# Patient Record
Sex: MALE | Race: WHITE | NOT HISPANIC OR LATINO | Employment: OTHER | URBAN - METROPOLITAN AREA
[De-identification: names, ages, dates, MRNs, and addresses within clinical notes are randomized per-mention and may not be internally consistent; named-entity substitution may affect disease eponyms.]

---

## 2022-09-08 ENCOUNTER — OFFICE VISIT (OUTPATIENT)
Dept: OPHTHALMOLOGY | Facility: MEDICAL CENTER | Age: 72
End: 2022-09-08
Payer: MEDICARE

## 2022-09-08 DIAGNOSIS — H49.9 OPHTHALMOPLEGIA: ICD-10-CM

## 2022-09-08 DIAGNOSIS — H52.13 MYOPIA OF BOTH EYES: ICD-10-CM

## 2022-09-08 DIAGNOSIS — H46.9 OPTIC NEUROPATHY: ICD-10-CM

## 2022-09-08 DIAGNOSIS — H25.13 AGE-RELATED NUCLEAR CATARACT OF BOTH EYES: ICD-10-CM

## 2022-09-08 DIAGNOSIS — R68.89 SUSPECTED GLAUCOMA OF BOTH EYES: ICD-10-CM

## 2022-09-08 DIAGNOSIS — G20.A1 PARKINSON DISEASE (HCC): ICD-10-CM

## 2022-09-08 PROCEDURE — 92060 SENSORIMOTOR EXAMINATION: CPT | Performed by: OPHTHALMOLOGY

## 2022-09-08 PROCEDURE — V2718 FRESNELL PRISM PRESS-ON LENS: HCPCS | Performed by: OPHTHALMOLOGY

## 2022-09-08 PROCEDURE — 92250 FUNDUS PHOTOGRAPHY W/I&R: CPT | Performed by: OPHTHALMOLOGY

## 2022-09-08 PROCEDURE — 99205 OFFICE O/P NEW HI 60 MIN: CPT | Mod: 25 | Performed by: OPHTHALMOLOGY

## 2022-09-08 RX ORDER — MIRABEGRON 50 MG/1
TABLET, FILM COATED, EXTENDED RELEASE ORAL
COMMUNITY
End: 2022-11-10

## 2022-09-08 RX ORDER — TESTOSTERONE GEL, 1% 10 MG/G
GEL TRANSDERMAL
COMMUNITY

## 2022-09-08 RX ORDER — TRAZODONE HYDROCHLORIDE 50 MG/1
TABLET ORAL
COMMUNITY
Start: 2022-09-02

## 2022-09-08 ASSESSMENT — ENCOUNTER SYMPTOMS
BLURRED VISION: 1
DOUBLE VISION: 1

## 2022-09-08 ASSESSMENT — REFRACTION_WEARINGRX
OS_ADD: +2.75
SPECS_TYPE: TRIFOCAL
OD_CYLINDER: +2.25
OS_AXIS: 085
OD_ADD: +2.75
OD_AXIS: 094
OS_SPHERE: -6.75
OS_CYLINDER: +1.75
OD_SPHERE: -5.00

## 2022-09-08 ASSESSMENT — CUP TO DISC RATIO
OS_RATIO: 0.2
OD_RATIO: 0.2

## 2022-09-08 ASSESSMENT — SLIT LAMP EXAM - LIDS
COMMENTS: NORMAL
COMMENTS: NORMAL

## 2022-09-08 ASSESSMENT — CONF VISUAL FIELD
OD_SUPERIOR_NASAL_RESTRICTION: 0
OS_SUPERIOR_NASAL_RESTRICTION: 0
OD_SUPERIOR_TEMPORAL_RESTRICTION: 0
OD_INFERIOR_NASAL_RESTRICTION: 0
OS_SUPERIOR_TEMPORAL_RESTRICTION: 0
OS_INFERIOR_NASAL_RESTRICTION: 0
OS_INFERIOR_TEMPORAL_RESTRICTION: 0
OS_NORMAL: 1
OD_INFERIOR_TEMPORAL_RESTRICTION: 0
OD_NORMAL: 1

## 2022-09-08 ASSESSMENT — VISUAL ACUITY
OS_CC: J2
METHOD: SNELLEN - LINEAR
OD_CC: J2
CORRECTION_TYPE: GLASSES
OS_CC: 20/30
OS_CC+: -1
OD_CC: 20/30
OD_CC+: -2

## 2022-09-08 ASSESSMENT — REFRACTION_MANIFEST
OS_AXIS: 064
OS_SPHERE: -4.50
OS_CYLINDER: +1.50
OD_CYLINDER: +3.00
OD_AXIS: 098
OD_SPHERE: -4.25

## 2022-09-08 ASSESSMENT — TONOMETRY
OS_IOP_MMHG: 12
OD_IOP_MMHG: 10
IOP_METHOD: I-CARE

## 2022-09-08 NOTE — ASSESSMENT & PLAN NOTE
9/8/2022-worsening control extraocular movements leading to some very mild convergence insufficiency at near.  Bilateral stare.  Slightly slow EOMs

## 2022-09-08 NOTE — ASSESSMENT & PLAN NOTE
9/8/2022-double vision slightly worse on right gaze secondary to a combination of early myopic strabismus fixes, adult onset divergence insufficiency, and parkinsonism.  Placed for diopter base out press on prism over the right lens to help increase binocular field.  Discussed that most likely will need prisms and glasses possibly 2 separate pair 1 for distance with basalis and 1 for near with base in if inability to control convergence insufficiency secondary to parkinsonism.  Contemplating cataract extraction in the near future so discussed giving new glasses following.  I also reviewed the MRI scan images which were only axial 9 contrast-enhanced but did not demonstrate any brainstem lesion

## 2022-09-08 NOTE — ASSESSMENT & PLAN NOTE
9/8/2022.-Tilted optic nerves with peripapillary atrophy IOP normal OCT nerve fiber layer thickness 83 OD 92 OS

## 2022-09-08 NOTE — PROGRESS NOTES
MD never showed on tele consult. Tech will call again. Peds/Neuro Ophthalmology:   Williams Thacker M.D.    Date & Time note created:    9/8/2022   1:46 PM     Referring MD / APRN:  Anson Hathaway P.A.-C., No att. providers found    Patient ID:  Name:             Collin Swift   YOB: 1950  Age:                 72 y.o.  male   MRN:               [id number]    Chief Complaint/Reason for Visit:     Other (New patient for double vision in both eyes. )      History of Present Illness:    Collin Swift is a 72 y.o. male   New patient for double vision in both eyes. Pt states vision is blurry for near with glasses. He sees double vision when he has to see far away or when he moves his head off to the side. Pt feels both eyes get very dry at times. Does not uses any eye drops. Pt has been told he need to have cataract surgery but at this time does not know if it would help double vision. 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       Review of Systems:  Review of Systems   Eyes:  Positive for blurred vision and double vision.        Dry eyes OU   All other systems reviewed and are negative.    Past Medical History:   Past Medical History:   Diagnosis Date    Incontinence of urine     Parkinson disease (HCC)        Past Surgical History:  History reviewed. No pertinent surgical history.    Current Outpatient Medications:  Current Outpatient Medications   Medication Sig Dispense Refill    Mirabegron ER (MYRBETRIQ) 50 MG TABLET SR 24 HR 1 tablet      carbidopa-levodopa (SINEMET)  MG Tab Take 1 Tablet by mouth 2 times a day.      traZODone (DESYREL) 50 MG Tab TAKE 1/2 (ONE-HALF) TABLET BY MOUTH EVERY DAY AT BEDTIME      testosterone (TESTIM/ANDROGEL) 50 MG/5GM (1%) Gel gel testosterone 1 % (50 mg/5 gram) transdermal gel packet   Apply 1 packet every day by transdermal route for 90 days.       No current facility-administered medications for this visit.       Allergies:  No Known Allergies    Family  History:  Family History   Problem Relation Age of Onset    Cancer Mother     Alcohol abuse Father     Drug abuse Sister     Colorectal Cancer Brother        Social History:  Social History     Socioeconomic History    Marital status:      Spouse name: Not on file    Number of children: Not on file    Years of education: Not on file    Highest education level: Not on file   Occupational History    Not on file   Tobacco Use    Smoking status: Never    Smokeless tobacco: Never   Vaping Use    Vaping Use: Never used   Substance and Sexual Activity    Alcohol use: Not Currently    Drug use: Never    Sexual activity: Not on file   Other Topics Concern    Not on file   Social History Narrative    Retired     Social Determinants of Health     Financial Resource Strain: Not on file   Food Insecurity: Not on file   Transportation Needs: Not on file   Physical Activity: Not on file   Stress: Not on file   Social Connections: Not on file   Intimate Partner Violence: Not on file   Housing Stability: Not on file          Physical Exam:  Physical Exam    Oriented x 3  Weight/BMI: There is no height or weight on file to calculate BMI.  There were no vitals taken for this visit.    Base Eye Exam       Visual Acuity (Snellen - Linear)         Right Left    Dist cc 20/30 -2 20/30 -1    Dist ph cc NI NI    Near cc J2 J2      Correction: Glasses              Tonometry (i[facility], 11:22 AM)         Right Left    Pressure 10 12              Pupils         Pupils    Right PERRL    Left PERRL              Visual Fields         Right Left     Full Full              Neuro/Psych       Mood/Affect: parkinsonism              Dilation       Both eyes: able to view without dilation                   Additional Tests       Color         Right Left    [place]sh[place] 12/12 12/12              Stereo       Fly: +    Animals: 3/3    Circles: 9/9                  Strabismus Exam         0 0 0   0 0 0                      E(T) 6 0  0  E(T) 4 0  0  E(T) 2                      0 0 0   0 0 0                       Slit Lamp and Fundus Exam       External Exam         Right Left    External stare stare              Slit Lamp Exam         Right Left    Lids/Lashes Normal Normal    Conjunctiva/Sclera White and quiet White and quiet    Cornea Clear Clear    Anterior Chamber Deep and quiet Deep and quiet    Iris Round and reactive Round and reactive    Lens Nuclear sclerosis Nuclear sclerosis    Vitreous Normal Normal              Fundus Exam         Right Left    Disc Peripapillary atrophy Peripapillary atrophy    C/D Ratio 0.2 0.2    Macula Normal Normal    Vessels Normal Normal    Periphery Normal Normal                  Refraction       Wearing Rx         Sphere Cylinder Axis Add    Right -5.00 +2.25 094 +2.75    Left -6.75 +1.75 085 +2.75      Age: 1yr    Type: Trifocal              Manifest Refraction         Sphere Cylinder Axis    Right -4.25 +3.00 098    Left -4.50 +1.50 064                    Pertinent Lab/Test/Imaging Review:  Notes from [facility], MRI images on CD    Assessment and Plan:     Parkinson disease (HCC)  9/8/2022-worsening control extraocular movements leading to some very mild convergence insufficiency at near.  Bilateral stare.  Slightly slow EOMs    Suspected glaucoma of both eyes  9/8/2022.-Tilted optic nerves with peripapillary atrophy IOP normal OCT nerve fiber layer thickness 83 OD 92 OS    Ophthalmoplegia  9/8/2022-double vision slightly worse on right gaze secondary to a combination of early myopic strabismus fixes, adult onset divergence insufficiency, and parkinsonism.  Placed for diopter base out press on prism over the right lens to help increase binocular field.  Discussed that most likely will need prisms and glasses possibly 2 separate pair 1 for distance with basalis and 1 for near with base in if inability to control convergence insufficiency secondary to parkinsonism.  Contemplating cataract extraction in the near  future so discussed giving new glasses following.  I also reviewed the MRI scan images which were only axial 9 contrast-enhanced but did not demonstrate any brainstem lesion    Myopia of both eyes  9/8/2022-bilateral moderate myopia with tilted nerves and peripapillary atrophy.  Probably exacerbating inability to control esotropia      Age-related nuclear cataract of both eyes  9/8/2022-bilateral nuclear sclerotic cataracts approaching visually significant.  Has consultation with [place] [facility]    Greater than 60 minute were spent examining the patient, reviewing records from referring providers including MRI images if available and records within the [facility] system, counselling the patient and family regarding the examination findings, diagnostic testing preformed, recommendations for management, prognosis, further testing and referrals as appropriate and follow up. This in addition to time spent interpreting separate billable tests done during the visit.      Williams Thacker M.D.

## 2022-09-08 NOTE — ASSESSMENT & PLAN NOTE
9/8/2022-bilateral moderate myopia with tilted nerves and peripapillary atrophy.  Probably exacerbating inability to control esotropia

## 2022-09-08 NOTE — ASSESSMENT & PLAN NOTE
9/8/2022-bilateral nuclear sclerotic cataracts approaching visually significant.  Has consultation with [facility]

## 2022-11-07 ASSESSMENT — ENCOUNTER SYMPTOMS: SLEEP DISTURBANCE: 1

## 2022-11-10 ENCOUNTER — OFFICE VISIT (OUTPATIENT)
Dept: SLEEP MEDICINE | Facility: MEDICAL CENTER | Age: 72
End: 2022-11-10
Payer: MEDICARE

## 2022-11-10 VITALS
OXYGEN SATURATION: 94 % | WEIGHT: 128 LBS | HEIGHT: 69 IN | DIASTOLIC BLOOD PRESSURE: 70 MMHG | RESPIRATION RATE: 14 BRPM | BODY MASS INDEX: 18.96 KG/M2 | HEART RATE: 60 BPM | SYSTOLIC BLOOD PRESSURE: 112 MMHG

## 2022-11-10 DIAGNOSIS — G47.00 FREQUENT NOCTURNAL AWAKENING: ICD-10-CM

## 2022-11-10 DIAGNOSIS — F51.04 CHRONIC INSOMNIA: ICD-10-CM

## 2022-11-10 DIAGNOSIS — G47.30 SLEEP DISORDER BREATHING: Primary | ICD-10-CM

## 2022-11-10 DIAGNOSIS — R53.83 FATIGUE, UNSPECIFIED TYPE: ICD-10-CM

## 2022-11-10 PROBLEM — N13.8 BENIGN PROSTATIC HYPERPLASIA WITH URINARY OBSTRUCTION: Status: ACTIVE | Noted: 2022-09-27

## 2022-11-10 PROBLEM — N40.1 BENIGN PROSTATIC HYPERPLASIA WITH URINARY OBSTRUCTION: Status: ACTIVE | Noted: 2022-09-27

## 2022-11-10 PROBLEM — R35.1 NOCTURIA: Status: ACTIVE | Noted: 2022-02-16

## 2022-11-10 PROCEDURE — 99204 OFFICE O/P NEW MOD 45 MIN: CPT | Performed by: STUDENT IN AN ORGANIZED HEALTH CARE EDUCATION/TRAINING PROGRAM

## 2022-11-10 ASSESSMENT — PATIENT HEALTH QUESTIONNAIRE - PHQ9
5. POOR APPETITE OR OVEREATING: 0 - NOT AT ALL
CLINICAL INTERPRETATION OF PHQ2 SCORE: 2
SUM OF ALL RESPONSES TO PHQ QUESTIONS 1-9: 17

## 2022-11-10 NOTE — PROGRESS NOTES
[facility] Consult Note     Date: 11/10/2022 / Time: 12:38 PM      Thank you for requesting a sleep medicine consultation on Collin Swift at the sleep center. Presents today with the chief complaints of snoring, occasional excessive daytime sleepiness, trouble staying asleep. He is referred by Porfirio Scanlon M.D.  [address] [facility] [address]  [place] [address] for evaluation and treatment of insomnia.    HISTORY OF PRESENT ILLNESS:     Collin Swift is a 72 y.o. male with Parkinson's disease, frequent urination, chronic insomnia.  Presents to Sleep Clinic for evaluation of insomnia and sleep.    He states for the last year he has been having trouble with sleep.  He does not have trouble falling asleep initially but does have trouble staying asleep.  He wakes up anywhere between 5 and 6 times a night to use the restroom.  With his awakenings he will sometimes have trouble getting back to sleep particularly around 3 AM to 4 AM.  Starting approximately 5 to 6 months ago he was placed on trazodone.  Starting with 25 mg and since increased to 100 mg.  He does find the trazodone helps him be able to sleep 4 hours a night.  States without trazodone he would sleep maybe an hour or so.    His wife shares the bedroom with him but not the same bed.  She does report that he snores loudly at the beginning of the night and as blankets on and this seems to decrease.  Denies any choking or gasping or pauses in breathing.  He does wake up feeling unrested.  He is tired at times during the day and does have fatigue.  He does have brain fog during the day.  He attributes some of this may be to his Parkinson's but he feels that his sleep is greatly impacting his quality of life.    He does move his legs at night.  Denies any purposeful movements such as punching kicking hitting.  He will occasionally yell in his sleep.    As per supplemental questionnaire to be scanned or imported into chart:    [place]  "Sleepiness Score: 11    Sleep Schedule  Bedtime: Weekday & Weekend 10pm  Wake time: Weekday & Weekend 3-4am  Sleep-onset latency: 15-20min   Awakenings from sleep: 5-6 times to restroom   Difficulty falling back asleep: yes   Bedroom partner: No separate beds  Naps: No     DAYTIME SYMPTOMS:   Excessive daytime sleepiness: Yes  Daytime fatigue: Yes  Difficulty concentrating: Yes  Memory problems: Yes  Irritability:Yes  Work/school performance issues: No   Sleepiness with driving: No  only short drives   Caffeine/stimulant use: No   Alcohol use:Yes, How Many? 3 oz of wine with dinner      SLEEP RELATED SYMPTOMS  Snoring: Yes loud at times at beginning of night   Witnessed apnea or gasping/choking: No   Dry mouth or mouth breathing: Yes  Sweating: No   Teeth grinding/biting: Yes per dentist  Morning headaches: No   Refreshed Upon Awakening: No      SLEEP RELATED BEHAVIORS:  Parasomnias (walking, talking, eating, violence): Yes, sometimes yelling   Leg kicking: Yes  Restless legs - \"urge to move\": No   Nightmares: No  Recurrent: No   Dream enactment: No      NARCOLEPSY:  Cataplexy: No   Sleep paralysis: No   Sleep attacks: No   Hypnagogic/hypnopompic hallucinations: No     MEDICAL HISTORY  Past Medical History:   Diagnosis Date    Chills     Constipation     Daytime sleepiness     Depression     Double vision     Fatigue     Fever     Frequent urination     [place] measles     Hoarseness, persistent     Hypertension     Incontinence of urine     Influenza     Insomnia     Parkinson disease (HCC)     Snoring     Tonsillitis     Toothache     Weakness     Wears glasses     Weight loss         SURGICAL HISTORY  Past Surgical History:   Procedure Laterality Date    TONSILLECTOMY          FAMILY HISTORY  Family History   Problem Relation Age of Onset    Cancer Mother     Alcohol abuse Father     Drug abuse Sister     Colorectal Cancer Brother     Cancer Brother         colon cancer    Colon Cancer Brother        SOCIAL " "HISTORY  Social History     Socioeconomic History    Marital status:    Tobacco Use    Smoking status: Former     Packs/day: 1.00     Years: 3.00     Pack years: 3.00     Types: Cigarettes, Pipe, Cigars     Start date: 1964     Quit date: 1967     Years since quittin.8    Smokeless tobacco: Never    Tobacco comments:     thank God I quit in my youth!   Vaping Use    Vaping Use: Never used   Substance and Sexual Activity    Alcohol use: Yes     Alcohol/week: 42.0 oz     Types: 70 Glasses of wine per week     Comment: just small glass of wine with dinner    Drug use: Not Currently     Types: Marijuana, Oral     Comment: LSD, hashish, as teen in 1960s   Social History Narrative    Retired        Occupation: retired     CURRENT MEDICATIONS  Current Outpatient Medications   Medication Sig Dispense Refill    carbidopa-levodopa (SINEMET)  MG Tab Take 1 Tablet by mouth 2 times a day.      traZODone (DESYREL) 50 MG Tab TAKE 1/2 (ONE-HALF) TABLET BY MOUTH EVERY DAY AT BEDTIME      testosterone (TESTIM/ANDROGEL) 50 MG/5GM (1%) Gel gel testosterone 1 % (50 mg/5 gram) transdermal gel packet   Apply 1 packet every day by transdermal route for 90 days.      Melatonin-Pyridoxine 5-1 MG Tab Melatonin (with B6) 5 mg-1 mg tablet   5mg 1/day       No current facility-administered medications for this visit.       REVIEW OF SYSTEMS  Constitutional: Denies fevers, Denies weight changes  Ears/Nose/Throat/Mouth: Denies nasal congestion or sore throat   Cardiovascular: Denies chest pain  Respiratory: Denies shortness of breath, Denies cough  Gastrointestinal/Hepatic: Denies nausea, vomiting  Sleep: see HPI    Physical Examination:  Vitals/ General Appearance:   Weight/BMI: Body mass index is 18.9 kg/m².  /70 (BP Location: Left arm, Patient Position: Sitting, BP Cuff Size: Large adult)   Pulse 60   Resp 14   Ht 1.753 m (5' 9\")   Wt 58.1 kg (128 lb)   SpO2 94%   Vitals:    11/10/22 1238   BP: 112/70   BP " "Location: Left arm   Patient Position: Sitting   BP Cuff Size: Large adult   Pulse: 60   Resp: 14   SpO2: 94%   Weight: 58.1 kg (128 lb)   Height: 1.753 m (5' 9\")       Pt. is alert and oriented to time, place and person. Cooperative and in no apparent distress.     Constitutional: Alert, no distress, well-groomed.  Skin: No rashes in visible areas.  Eye: Round. Conjunctiva clear, lids normal. No icterus.   ENT EXAM  Nasal alae/valves collapsible: No   Nasal septum deviation: Yes  Nasal turbinate hypertrophy: Left: Grade 2   Right: Grade 1  Hard palate narrow: Yes  Hard palate high: Yes  Soft palate/uvula (Mallampati score): 3  Tongue Scalloping: No   Retrognathia: No   Micrognathia: No   Cardiovascular:no murmus/gallops/rubs, normal S1 and S2 heart sounds, regular rate and rhythm  Pulmonary:Clear to auscultation, No wheezes, No crackles.  Neurologic:Awake, alert and oriented x 3, Normal age appropriate gait, No involuntary motions.  Extremities: No clubbing, cyanosis, or edema       ASSESSMENT AND PLAN   Collin Swift is a 72 y.o. male with Parkinson's disease, frequent urination, chronic insomnia.  Presents to Sleep Clinic for evaluation of insomnia and sleep.    1. Collin Swift  has symptoms of Obstructive Sleep Apnea (PREET). Collin Swift has symptoms of snoring, frequent nocturnal awakenings, nocturia, unrefreshed upon awakening, fatigue, brain fog. These  interfere with activities of daily living.   ESS 11  Pt has risk factors for PREET include age and crowded oropharynx.     The pathophysiology of PREET and the increased risk of cardiovascular morbidity from untreated PREET is discussed in detail with the patient. He  also has nsomnia, which can be worsened by PREET.      We have discussed diagnostic options including in-laboratory, attended polysomnography and home sleep testing. We have also discussed treatment options including airway pressurization, reconstructive otolaryngologic surgery, dental appliances and " weight management.     Subsequently,treatment options will be discussed based on the diagnostic study. Meanwhile, He is urged to avoid supine sleep, weight gain and alcoholic beverages since all of these can worsen PREET. He is cautioned against drowsy driving. If He feels sleepy while driving, advised must pull over for a break/nap, rather than persist on the road, in the interest of Pt's own safety and that of others on the road.    Plan  -  He  will be scheduled for an overnight PSG to assess sleep related breathing disorder.  - Follow up 1-2 weeks after sleep study to discuss results and treatment options moving forward   -Advised to reach out via [employer] or by phone with any questions or concerns.     2. Chronic Insomnia   With  frequent awakenings with difficulty falling back asleep and feeling this is impacting daily functioning for past year meets criteria for chronic insomnia. Discussed potential causes of insomnia and importance of having a routine around bedtime. Dicussed cognitive behavioral therapy for insomnia (CBTi) which is first line treatment for insomnia.     Advised that since there is some suspicion of obstructive sleep apnea would recommend doing for sleep apnea.  If test is positive treating for sleep apnea may improve his chronic insomnia.  Test is negative patient would benefit from undergoing cognitive behavioral therapy for insomnia.    RECOMMENDATIONS  -Maintain a regular sleep schedule  -Avoid caffeinated beverages after lunch, and alcohol in late afternoon and evening  -Avoid prolonged use of light-emitting screens before bedtime  -Exercise regularly for at least 20 minutes, preferably more than four to five hours prior to bedtime  -Avoid daytime naps, especially if they are longer than 20 to 30 minutes or occur late in the day  -Advised to contact our office or myself with any questions via "[employer]hart     Regarding treatment of other past medical problems and general health maintenance,  Pt  is urged to follow up with PCP.      Please note portions of this record was created using voice recognition software. I have made every reasonable attempt to correct obvious errors, but I expect that there are errors of grammar and possibly content I did not discover before finalizing the note.

## 2023-02-13 ENCOUNTER — APPOINTMENT (OUTPATIENT)
Dept: SLEEP MEDICINE | Facility: MEDICAL CENTER | Age: 73
End: 2023-02-13
Attending: STUDENT IN AN ORGANIZED HEALTH CARE EDUCATION/TRAINING PROGRAM
Payer: MEDICARE

## 2023-02-27 ENCOUNTER — APPOINTMENT (OUTPATIENT)
Dept: SLEEP MEDICINE | Facility: MEDICAL CENTER | Age: 73
End: 2023-02-27
Payer: MEDICARE

## 2023-03-03 ENCOUNTER — SLEEP STUDY (OUTPATIENT)
Dept: SLEEP MEDICINE | Facility: MEDICAL CENTER | Age: 73
End: 2023-03-03
Attending: STUDENT IN AN ORGANIZED HEALTH CARE EDUCATION/TRAINING PROGRAM
Payer: MEDICARE

## 2023-03-03 DIAGNOSIS — G47.00 FREQUENT NOCTURNAL AWAKENING: ICD-10-CM

## 2023-03-03 DIAGNOSIS — R53.83 FATIGUE, UNSPECIFIED TYPE: ICD-10-CM

## 2023-03-03 DIAGNOSIS — G47.30 SLEEP DISORDER BREATHING: ICD-10-CM

## 2023-03-03 DIAGNOSIS — F51.04 CHRONIC INSOMNIA: ICD-10-CM

## 2023-03-03 PROCEDURE — 95811 POLYSOM 6/>YRS CPAP 4/> PARM: CPT | Performed by: STUDENT IN AN ORGANIZED HEALTH CARE EDUCATION/TRAINING PROGRAM

## 2023-03-06 NOTE — PROCEDURES
MONTAGE: Standard  STUDY TYPE: Split Night    RECORDING TECHNIQUE:   After the scalp was prepared, gold plated electrodes were applied to the scalp according to the International 10-20 System. EEG (electroencephalogram) was continuously monitored from the O1-M2, O2-M1, C3-M2, C4-M1, F3-M2, and F4-M1. EOGs (electrooculograms) were monitored by electrodes placed at the left and right outer canthi. Chin EMG (electromyogram) was monitored by electrodes placed on the mentalis and sub-mentalis muscles. Nasal and oral airflow were monitored using a triple port thermocouple as well as oronasal pressure transducer. Respiratory effort was measured by inductive plethysmography technology employing abdominal and thoracic belts. Blood oxygen saturation and pulse were monitored by pulse oximetry. Heart rhythm was monitored by surface electrocardiogram. Leg EMG was studied using surface electrodes placed on left and right anterior tibialis. A microphone was used to monitor tracheal sounds and snoring. Body position was monitored and documented by technician observation.   SCORING CRITERIA:   A modification of the AASM manual for scoring of sleep and associated events was used. Obstructive apneas were scored by cessation of airflow for at least 10 seconds with continuing respiratory effort. Central apneas were scored by cessation of airflow for at least 10 seconds with no respiratory effort. Hypopneas were scored by a 30% or more reduction in airflow for at least 10 seconds accompanied by arterial oxygen desaturation of 3% or an arousal. For CMS (Medicare) patients, per AASM rule 1B, hypopneas are scored by 30% with mild reduction in airflow for at least 10 seconds accompanied by arterial saturation decreased at 4%.    DIAGNOSTIC  Study start time was 09:03:44 PM. Diagnostic recording time was 196 minutes with a total sleep time of 151 minutes resulting in a sleep efficiency of 76.84%%. Sleep latency from the start of the study was  10 minutes and the latency from sleep to REM was 78 minutes. In total,22 arousals were scored for an arousal index of 8.7.  Respiratory:  There were a total of 15 apneas consisting of 3 obstructive apneas, 0 mixed apneas, and 12 central apneas. A total of 74 hypopneas were scored. The apnea index was 5.96 per hour and the hypopnea index was 29.40 per hour resulting in an overall AHI of 35.36. AHI during rem was 21.4 and AHI while supine was 35.36.  Oximetry:  There was a mean oxygen saturation of 93.0%. The minimum oxygen saturation during NREM sleep was83.0% and in REM was 89.0%. Time spent during sleep with oxygen saturations <88% was 11.2 minutes.   Cardiac:  The highest heart rate seen while awake was 118 BPM while the highest heart rate during sleep was 79 BPM with an average sleeping heart rate of 54 BPM.  Limb Movements:  There were a total of 15 PLMs during sleep, which resulted in a PLM index of 6.0. There were 0 PLMs associated with arousals which resulted in a PLMS arousal index of 0.0.    TREATMENT:  Treatment recording time was 4h 37.0m (277 minutes) with a total sleep time of 3h 50.5m (230 minutes) resulting in a sleep efficiency of 83.2%. Sleep latency from the start of treatment was 02 minutes and REM latency from sleep onset was 1h 23.0m. The patient had 38 arousals in total for an arousal index of 9.9.  Respiratory:   There were 5 apneas in total consisting of 1 obstructive apneas, 4 central apneas, and 0 mixed apneas for an apnea index of 1.30. The patient had 2 hypopneas in total, which resulted in a hypopnea index of 0.52. The overall AHI was 1.82, with a REM AHI of 2.22, and a supine AHI of 1.82.   Oximetry:  The mean SaO2 during treatment was 95.0%. The minimum oxygen saturation in NREM was92.0 % and in REM was 93.0%. Patient spent 0.0 minutes of TST with SaO2 <88%.  Cardiac:  The highest heart rate during sleep was 79 BPM with an average sleeping heart rate of 53BPM.  Limb Movements:  There  were a total of 0 PLMS during titration sleep time that resulted in an index of 0.0. There were 3 PLMS associated with arousals. This resulted in a PLM arousal index of 0.8.  Titration: CPAP was tried from 5-9cm H2O.  This was a fully attended sleep study. This test was technically adequate. This patient was titrated on CPAP starting at 5 cm of water pressure. Patient was titrated up to 9 cm of water pressure. Patient did best at 8 cm of water pressure. Patient spent 29 minutes at that pressure and their AHI was 0 which is considered treated obstructive sleep apnea.     Impression:  1.  Severe obstructive sleep apnea with an overall AHI during diagnostic portion of 35.4  2.  Mild nocturnal hypoxia secondary to untreated sleep apnea minimum oxygen saturation 83%, time at or below 88% saturation of 11.2 minutes during diagnostic portion  3.  Met criteria for split-night protocol  4.  Patient responded well to CPAP therapy  5.  Supine REM sleep was seen while on CPAP  6.  Oxygen saturations improved while on CPAP therapy    Recommendations:  I recommend auto CPAP of 5-9 cm.  Patient used a medium DreamWear mask during night of study.     I also recommend 30 day compliance download to assess the efficacy to the recommended pressure, measure leak, apnea hypopnea index and compliance for further outpatient monitoring and management of PAP therapy. In some cases alternative treatment options may be proven effective in resolving sleep apnea. These options include upper airway surgery, the use of a dental orthotic, weight loss, or positional therapy. Clinical correlation is required. In general patients with sleep apnea are advised to avoid alcohol, sedatives and not to operate a motor vehicle while drowsy.  Untreated sleep apnea increases the risk for cardiovascular and neurovascular disease.

## 2023-03-13 ENCOUNTER — APPOINTMENT (OUTPATIENT)
Dept: SLEEP MEDICINE | Facility: MEDICAL CENTER | Age: 73
End: 2023-03-13
Payer: MEDICARE

## 2023-03-15 NOTE — PROGRESS NOTES
[facility] Follow-up Visit    Date of Visit: 3/21/2023     CC:  Follow-up for PREET management      HPI:  Collin Swift is a very pleasant 72 y.o. year old male former smoker (3 pack-years, quit in 1967), with a PMHx of Parkinson's, insomnia, who presented to the [facility] for a regular follow up. Last seen in the office on 11/10/2022 with Dr. Rivera.     Patient presents for sleep study results.  ***    Denies morning headaches.    Denies daytime drowsiness / driving drowsy.     Denies any issues falling asleep.      Snoring / Gasping / Apneas    Palpitations    Dry mouth    Denies any symptoms of RLS, narcolepsy or any nightmares, sleep walking or acting out of dreams.    Aerophagia    Mask leak / Skin irritation    Goes to bed:  Wakes up:  Naps (frequency and duration):  Awakenings:  Issues falling back to sleep?    Sleep History:  PSG 3/3/2023-        Patient Active Problem List    Diagnosis Date Noted    Fatigue 10/12/2022    Benign prostatic hyperplasia with urinary obstruction 09/27/2022    Parkinson disease (HCC)     Suspected glaucoma of both eyes     Ophthalmoplegia     Myopia of both eyes     Age-related nuclear cataract of both eyes     Nocturia 02/16/2022     Past Medical History:   Diagnosis Date    Chills     Constipation     Daytime sleepiness     Depression     Double vision     Fatigue     Fever     Frequent urination     [place] measles     Hoarseness, persistent     Hypertension     Incontinence of urine     Influenza     Insomnia     Parkinson disease (HCC)     Snoring     Tonsillitis     Toothache     Weakness     Wears glasses     Weight loss       Past Surgical History:   Procedure Laterality Date    TONSILLECTOMY       Family History   Problem Relation Age of Onset    Cancer Mother     Alcohol abuse Father     Drug abuse Sister     Colorectal Cancer Brother     Cancer Brother         colon cancer    Colon Cancer Brother      Social History     Socioeconomic History    Marital  status:      Spouse name: Not on file    Number of children: Not on file    Years of education: Not on file    Highest education level: Not on file   Occupational History    Not on file   Tobacco Use    Smoking status: Former     Packs/day: 1.00     Years: 3.00     Pack years: 3.00     Types: Cigarettes, Pipe, Cigars     Start date: 1964     Quit date: 1967     Years since quittin.2    Smokeless tobacco: Never    Tobacco comments:     thank God I quit in my youth!   Vaping Use    Vaping Use: Never used   Substance and Sexual Activity    Alcohol use: Yes     Alcohol/week: 42.0 oz     Types: 70 Glasses of wine per week     Comment: just small glass of wine with dinner    Drug use: Not Currently     Types: Marijuana, Oral     Comment: LSD, hashish, as teen in 1960s    Sexual activity: Not on file   Other Topics Concern    Not on file   Social History Narrative    Retired     Social Determinants of Health     Financial Resource Strain: Not on file   Food Insecurity: Not on file   Transportation Needs: Not on file   Physical Activity: Not on file   Stress: Not on file   Social Connections: Not on file   Intimate Partner Violence: Not on file   Housing Stability: Not on file     Current Outpatient Medications   Medication Sig Dispense Refill    Melatonin-Pyridoxine 5-1 MG Tab Melatonin (with B6) 5 mg-1 mg tablet   5mg 1/day      carbidopa-levodopa (SINEMET)  MG Tab Take 1 Tablet by mouth 2 times a day.      traZODone (DESYREL) 50 MG Tab TAKE 1/2 (ONE-HALF) TABLET BY MOUTH EVERY DAY AT BEDTIME      testosterone (TESTIM/ANDROGEL) 50 MG/5GM (1%) Gel gel testosterone 1 % (50 mg/5 gram) transdermal gel packet   Apply 1 packet every day by transdermal route for 90 days.       No current facility-administered medications for this visit.      ALLERGIES: Patient has no known allergies.    ROS:  Constitutional: Denies fever, chills, sweats,  weight loss, fatigue  Cardiovascular: Denies chest pain,  tightness, palpitations, swelling in legs/feet  Respiratory: Denies shortness of breath, cough, sputum, wheezing, painful breathing   Sleep: per HPI  Gastrointestinal: Denies  difficulty swallowing, nausea, abdominal pain, diarrhea, constipation, heartburn.  Musculoskeletal: Denies painful joints, sore muscles,       PHYSICAL EXAM:  There were no vitals taken for this visit.  Appearance: Well-nourished, well-developed, no acute distress  Eyes:  No scleral icterus , EOMI  ENMT: No redness of the oropharynx***  Lung auscultation:  No wheezes rhonchi rubs or rales***  Cardiac: No murmurs, rubs, or gallops; regular rhythm, normal rate; no edema***  Musculoskeletal:  Grossly normal; gait and station normal; digits and nails normal  Skin:  No rashes, petechiae, cyanosis  Neurologic: without focal signs; oriented to person, time, place, and purpose; judgement intact  Psychiatric:  No depression, anxiety, agitation  Mallampati score: Class ***    Assessment and Plan:    The medical record was reviewed.    Diagnostic and titration nocturnal polysomnogram's, home sleep apnea tests, continuous nocturnal oximetry results, multiple sleep latency tests, and compliance reports reviewed.    Problem List Items Addressed This Visit    None      PLAN: ****    1. Sleep Apnea:    The pathophysiology of sleep anea and the increased risk of cardiovascular morbidity from untreated sleep apnea is discussed in detail with the patient.  Urged to avoid supine sleep, weight gain and alcoholic beverages since all of these can worsen sleep apnea. Cautioned against drowsy driving. If feeling sleepy while driving, pull over for a break/nap, rather than persist on the road, in the interest of own safety and that of others on the road.    Compliance download was reviewed and discussed with the patient.     - Order placed for mask and supplies   - Compliance was reinforced  - Recommended the patient against the use of Ozone , such as  SoClean  - Clean supplies a couple times a week with dish soap and water and air dry  - Recommended the patient change out supplies as recommended for best mask fit and usage of the machine  - Advised patient to reach out via datangohart if any questions or concerns should arise.   - Equipment replacement schedule:  Mask cushion every month  Nasal pillows 2 times per month  Mask every 6 months  Head gear every 6 months  Tubing every 3 months  Ultra-fine filters 2 times per month  Foam filter every 6 months  Humidifier chamber every 6 months  Chin strap every 6 months    Has been advised to continue the current ***, clean equipment frequently, and get new mask and supplies as allowed by insurance and DME. Recommend an earlier appointment, if significant treatment barriers develop.    The risks of untreated sleep apnea were discussed with the patient at length. Patients with sleep apnea are at increased risk of cardiovascular disease including coronary artery disease, systemic arterial hypertension, pulmonary arterial hypertension, cardiac arrythmias, and stroke. The patient was advised to avoid driving a motor vehicle when drowsy.    Positive airway pressure will favorably impact many of the adverse conditions and effects provoked by sleep apnea.    Have advised the patient to follow up with the appropriate healthcare practitioners for all other medical problems and issues.    No follow-ups on file.      Please note portions of this record was created using voice recognition software. I have made every reasonable attempt to correct obvious errors, but I expect that there are errors of grammar and possibly content I did not discover before finalizing the note.    Time spent in record review prior to patient arrival, reviewing results, and in face-to-face encounter totaled *** min.  __________  MAKENNA Flower  Pulmonary & Sleep Medicine  [facility]

## 2023-03-21 ENCOUNTER — APPOINTMENT (OUTPATIENT)
Dept: SLEEP MEDICINE | Facility: MEDICAL CENTER | Age: 73
End: 2023-03-21
Payer: MEDICARE

## 2023-04-14 ENCOUNTER — APPOINTMENT (OUTPATIENT)
Dept: SLEEP MEDICINE | Facility: MEDICAL CENTER | Age: 73
End: 2023-04-14
Attending: NURSE PRACTITIONER
Payer: MEDICARE

## 2023-04-25 ENCOUNTER — OFFICE VISIT (OUTPATIENT)
Dept: SLEEP MEDICINE | Facility: MEDICAL CENTER | Age: 73
End: 2023-04-25
Attending: STUDENT IN AN ORGANIZED HEALTH CARE EDUCATION/TRAINING PROGRAM
Payer: MEDICARE

## 2023-04-25 VITALS
HEIGHT: 69 IN | SYSTOLIC BLOOD PRESSURE: 112 MMHG | WEIGHT: 151 LBS | RESPIRATION RATE: 14 BRPM | DIASTOLIC BLOOD PRESSURE: 66 MMHG | OXYGEN SATURATION: 96 % | BODY MASS INDEX: 22.36 KG/M2 | HEART RATE: 50 BPM

## 2023-04-25 DIAGNOSIS — G47.34 NOCTURNAL HYPOXIA: ICD-10-CM

## 2023-04-25 DIAGNOSIS — G47.33 OSA (OBSTRUCTIVE SLEEP APNEA): Primary | ICD-10-CM

## 2023-04-25 DIAGNOSIS — R53.83 FATIGUE, UNSPECIFIED TYPE: ICD-10-CM

## 2023-04-25 PROCEDURE — 99212 OFFICE O/P EST SF 10 MIN: CPT | Performed by: STUDENT IN AN ORGANIZED HEALTH CARE EDUCATION/TRAINING PROGRAM

## 2023-04-25 PROCEDURE — 99213 OFFICE O/P EST LOW 20 MIN: CPT | Performed by: STUDENT IN AN ORGANIZED HEALTH CARE EDUCATION/TRAINING PROGRAM

## 2023-04-25 NOTE — PROGRESS NOTES
[facility] Follow-up Visit    CC: Poor sleep and exhaustion during the day      HPI:  Collin Swift is a 72 y.o.male  with Parkinson's disease, daytime fatigue, and severe obstructive sleep apnea.  Presents today to discuss recent sleep study results.    He states he continues to feel tired during the day and feels exhausted.  He does not find his sleep restorative.  He is unsure as to why his sleep is so poor.  He has undergone a urologic procedure which has helped his urination during the day and at night.  He continues to wake up feeling unrested and have brain fog during the day.    He reports neither sleep study went well.  He did feel that CPAP helped his sleep.  He was surprised to learn that he slept as well as he did in the sleep lab.  His wife reported that following in lab study he seemed to have more energy during the day.    Sleep History  3/3/2023 PSG split-night study showed severe obstructive sleep apnea with an overall AHI of 35 events an hour.  Minimum oxygen saturation of 83%, time out of below 88% saturation during diagnostic portion of 11.2 minutes.  Patient responded very well to CPAP therapy.  AHI on CPAP was 1.82 and no time was spent at or below 88%.  Recommended pressures of 5-9 cmH2O    Patient Active Problem List    Diagnosis Date Noted    Fatigue 10/12/2022    Benign prostatic hyperplasia with urinary obstruction 09/27/2022    Parkinson disease (HCC)     Suspected glaucoma of both eyes     Ophthalmoplegia     Myopia of both eyes     Age-related nuclear cataract of both eyes     Nocturia 02/16/2022       Past Medical History:   Diagnosis Date    Chills     Constipation     Daytime sleepiness     Depression     Double vision     Fatigue     Fever     Frequent urination     Citizen of [place] measles     Hoarseness, persistent     Hypertension     Incontinence of urine     Influenza     Insomnia     Parkinson disease (HCC)     Snoring     Tonsillitis     Toothache     Weakness     Wears  glasses     Weight loss         Past Surgical History:   Procedure Laterality Date    TONSILLECTOMY         Family History   Problem Relation Age of Onset    Cancer Mother     Alcohol abuse Father     Drug abuse Sister     Colorectal Cancer Brother     Cancer Brother         colon cancer    Colon Cancer Brother        Social History     Socioeconomic History    Marital status:      Spouse name: Not on file    Number of children: Not on file    Years of education: Not on file    Highest education level: Not on file   Occupational History    Not on file   Tobacco Use    Smoking status: Former     Packs/day: 1.00     Years: 3.00     Pack years: 3.00     Types: Cigarettes, Pipe, Cigars     Start date: 1964     Quit date: 1967     Years since quittin.3    Smokeless tobacco: Never    Tobacco comments:     thank God I quit in my youth!   Vaping Use    Vaping Use: Never used   Substance and Sexual Activity    Alcohol use: Yes     Alcohol/week: 42.0 oz     Types: 70 Glasses of wine per week     Comment: just small glass of wine with dinner    Drug use: Not Currently     Types: Marijuana, Oral     Comment: LSD, hashish, as teen in 1960s    Sexual activity: Not on file   Other Topics Concern    Not on file   Social History Narrative    Retired     Social Determinants of Health     Financial Resource Strain: Not on file   Food Insecurity: Not on file   Transportation Needs: Not on file   Physical Activity: Not on file   Stress: Not on file   Social Connections: Not on file   Intimate Partner Violence: Not on file   Housing Stability: Not on file       Current Outpatient Medications   Medication Sig Dispense Refill    Melatonin-Pyridoxine 5-1 MG Tab Melatonin (with B6) 5 mg-1 mg tablet   5mg 1/day      carbidopa-levodopa (SINEMET)  MG Tab Take 1 Tablet by mouth 2 times a day.      traZODone (DESYREL) 50 MG Tab TAKE 1/2 (ONE-HALF) TABLET BY MOUTH EVERY DAY AT BEDTIME      testosterone (TESTIM/ANDROGEL)  "50 MG/5GM (1%) Gel gel testosterone 1 % (50 mg/5 gram) transdermal gel packet   Apply 1 packet every day by transdermal route for 90 days.       No current facility-administered medications for this visit.        ALLERGIES: Patient has no known allergies.    ROS  Constitutional: Denies fevers, Denies weight changes  Ears/Nose/Throat/Mouth: Denies nasal congestion or sore throat   Cardiovascular: Denies chest pain  Respiratory: Denies shortness of breath, Denies cough  Gastrointestinal/Hepatic: Denies nausea, vomiting  Sleep: see HPI      PHYSICAL EXAM  /66 (BP Location: Left arm, Patient Position: Sitting, BP Cuff Size: Large adult)   Pulse (!) 50   Resp 14   Ht 1.753 m (5' 9\")   Wt 68.5 kg (151 lb)   SpO2 96%   BMI 22.30 kg/m²   Appearance: Well-nourished, well-developed, no acute distress  Eyes:  No scleral icterus , EOMI  Musculoskeletal:  Grossly normal; gait and station normal; digits and nails normal  Skin:  No rashes, petechiae, cyanosis  Neurologic: without focal signs; oriented to person, time, place, and purpose; judgement intact      Medical Decision Making   Assessment and Plan  Collin Swift is a 72 y.o.male  with Parkinson's disease, daytime fatigue, and severe obstructive sleep apnea.  Presents today to discuss recent sleep study results.    The medical record was reviewed.    Obstructive sleep apnea   Reviewed recent PSG split-night with patient showing an AHI of 35,  and Min Oxygen saturation of 83%.  Time at or below 88% saturation 11 minutes  Based on sleep study and symptoms meets criteria for severe obstructive sleep apnea.   We discussed the pathophysiology of obstructive sleep apnea (PREET) and risk factors for the disease. We also discussed possible consequences of untreated PREET, including excessive daytime sleepiness and fatigue, cognitive dysfunction, cardiovascular complications such as elevated blood pressure, heart attacks, cardiac arrhythmias, and strokes.    We will proceed " CPAP therapy.     RECOMMENDATIONS  -Start Auto CPAP at pressures 5-9 cm H2O  -Discussed importance of adherence/compliance   -Prescription generated for supplies   -Patient counseled to avoid driving when sleepy. Encouraged to anticipate sleepiness, consider taking a 10 min nap prior to driving, alternate with another , or pull over if sleepy while driving  -Advised to contact our office or myself with any questions via MyChart  -Follow up in 3 months or sooner if needed      Positive airway pressure will favorably impact many of the adverse conditions and effects provoked by PREET.    Have advised the patient to follow up with the appropriate healthcare practitioners for all other medical problems and issues.    Return in about 3 months (around 7/25/2023).      Please note portions of this record was created using voice recognition software. I have made every reasonable attempt to correct obvious errors, but I expect that there are errors of grammar and possibly content I did not discover before finalizing the note.

## 2023-05-24 ENCOUNTER — APPOINTMENT (OUTPATIENT)
Dept: SLEEP MEDICINE | Facility: MEDICAL CENTER | Age: 73
End: 2023-05-24
Payer: MEDICARE

## 2023-07-25 ENCOUNTER — APPOINTMENT (OUTPATIENT)
Dept: SLEEP MEDICINE | Facility: MEDICAL CENTER | Age: 73
End: 2023-07-25
Attending: STUDENT IN AN ORGANIZED HEALTH CARE EDUCATION/TRAINING PROGRAM
Payer: MEDICARE